# Patient Record
(demographics unavailable — no encounter records)

---

## 2025-03-21 NOTE — HISTORY OF PRESENT ILLNESS
[de-identified] : 28 F With depression, anxiety, PTSD, bipolar type II disorder is presenting today for a physical examination and acute visit.  The patient states that she has a strong family history of cancer and autoimmune disease.  She follows with a hematologist at [facility] in [place].  She recently had blood work done by the hematologist which has been reviewed by me today and included a workup for lymphoma.  The patient reports that after her second pregnancy in 2019 she started developing nonspecific symptoms intermittently including night sweats, temperature changes, rashes, painful lymph nodes in her neck, joint pain specifically of the right knee but also feels soreness in her hands, hair loss, fatigue, loss of appetite as well as increased migraines. In addition, she has not taken her psychiatric medications in several days. She follows with a psychiatrist who prescribes her medications.  She also reports worsening asthma symptoms and cannot lay flat at night due to wheezing.  She has been using her albuterol inhaler more than usual.

## 2025-03-21 NOTE — HEALTH RISK ASSESSMENT
[Good] : ~his/her~  mood as  good [Yes] : Yes [Monthly or less (1 pt)] : Monthly or less (1 point) [1 or 2 (0 pts)] : 1 or 2 (0 points) [Never (0 pts)] : Never (0 points) [No] : In the past 12 months have you used drugs other than those required for medical reasons? No [No falls in past year] : Patient reported no falls in the past year [Little interest or pleasure doing things] : 1) Little interest or pleasure doing things [Feeling down, depressed, or hopeless] : 2) Feeling down, depressed, or hopeless [0] : 2) Feeling down, depressed, or hopeless: Not at all (0) [Patient reported PAP Smear was normal] : Patient reported PAP Smear was normal [HIV Test offered] : HIV Test offered [Hepatitis C test offered] : Hepatitis C test offered [With Family] : lives with family [Employed] : employed [Single] : single [# Of Children ___] : has [unfilled] children [Sexually Active] : sexually active [Feels Safe at Home] : Feels safe at home [Fully functional (bathing, dressing, toileting, transferring, walking, feeding)] : Fully functional (bathing, dressing, toileting, transferring, walking, feeding) [Fully functional (using the telephone, shopping, preparing meals, housekeeping, doing laundry, using] : Fully functional and needs no help or supervision to perform IADLs (using the telephone, shopping, preparing meals, housekeeping, doing laundry, using transportation, managing medications and managing finances) [Smoke Detector] : smoke detector [Carbon Monoxide Detector] : carbon monoxide detector [Seat Belt] :  uses seat belt [Sunscreen] : uses sunscreen [Never] : Never [NO] : No [PHQ-2 Negative - No further assessment needed] : PHQ-2 Negative - No further assessment needed [de-identified] : None [de-identified] : None [de-identified] : Ocassionally [de-identified] : Workout 5 times a week [de-identified] : trying to gain weight [[id number]] : 0 [Change in mental status noted] : No change in mental status noted [Language] : denies difficulty with language [Behavior] : denies difficulty with behavior [Learning/Retaining New Information] : denies difficulty learning/retaining new information [Handling Complex Tasks] : denies difficulty handling complex tasks [Reasoning] : denies difficulty with reasoning [Spatial Ability and Orientation] : denies difficulty with spatial ability and orientation [Reports changes in hearing] : Reports no changes in hearing [Reports changes in vision] : Reports no changes in vision [Reports normal functional visual acuity (ie: able to read med bottle)] : Reports poor functional visual acuity.  [Reports changes in dental health] : Reports no changes in dental health [Safety elements used in home] : no safety elements used in home [Travel to Developing Areas] : does not  travel to developing areas [TB Exposure] : is not being exposed to tuberculosis [Caregiver Concerns] : does not have caregiver concerns [PapSmearDate] : 01/2022

## 2025-03-21 NOTE — HEALTH RISK ASSESSMENT
[Good] : ~his/her~  mood as  good [Yes] : Yes [Monthly or less (1 pt)] : Monthly or less (1 point) [1 or 2 (0 pts)] : 1 or 2 (0 points) [Never (0 pts)] : Never (0 points) [No] : In the past 12 months have you used drugs other than those required for medical reasons? No [No falls in past year] : Patient reported no falls in the past year [Little interest or pleasure doing things] : 1) Little interest or pleasure doing things [Feeling down, depressed, or hopeless] : 2) Feeling down, depressed, or hopeless [0] : 2) Feeling down, depressed, or hopeless: Not at all (0) [Patient reported PAP Smear was normal] : Patient reported PAP Smear was normal [HIV Test offered] : HIV Test offered [Hepatitis C test offered] : Hepatitis C test offered [With Family] : lives with family [Employed] : employed [Single] : single [# Of Children ___] : has [unfilled] children [Sexually Active] : sexually active [Feels Safe at Home] : Feels safe at home [Fully functional (bathing, dressing, toileting, transferring, walking, feeding)] : Fully functional (bathing, dressing, toileting, transferring, walking, feeding) [Fully functional (using the telephone, shopping, preparing meals, housekeeping, doing laundry, using] : Fully functional and needs no help or supervision to perform IADLs (using the telephone, shopping, preparing meals, housekeeping, doing laundry, using transportation, managing medications and managing finances) [Smoke Detector] : smoke detector [Carbon Monoxide Detector] : carbon monoxide detector [Seat Belt] :  uses seat belt [Sunscreen] : uses sunscreen [Never] : Never [NO] : No [PHQ-2 Negative - No further assessment needed] : PHQ-2 Negative - No further assessment needed [de-identified] : None [de-identified] : None [de-identified] : Ocassionally [de-identified] : Workout 5 times a week [de-identified] : trying to gain weight [[id number]] : 0 [Change in mental status noted] : No change in mental status noted [Language] : denies difficulty with language [Behavior] : denies difficulty with behavior [Learning/Retaining New Information] : denies difficulty learning/retaining new information [Handling Complex Tasks] : denies difficulty handling complex tasks [Reasoning] : denies difficulty with reasoning [Spatial Ability and Orientation] : denies difficulty with spatial ability and orientation [Reports changes in hearing] : Reports no changes in hearing [Reports changes in vision] : Reports no changes in vision [Reports normal functional visual acuity (ie: able to read med bottle)] : Reports poor functional visual acuity.  [Reports changes in dental health] : Reports no changes in dental health [Safety elements used in home] : no safety elements used in home [Travel to Developing Areas] : does not  travel to developing areas [TB Exposure] : is not being exposed to tuberculosis [Caregiver Concerns] : does not have caregiver concerns [PapSmearDate] : 01/2022

## 2025-03-21 NOTE — HISTORY OF PRESENT ILLNESS
[de-identified] : 28 F With depression, anxiety, PTSD, bipolar type II disorder is presenting today for a physical examination and acute visit.  The patient states that she has a strong family history of cancer and autoimmune disease.  She follows with a hematologist at [facility] in [place].  She recently had blood work done by the hematologist which has been reviewed by me today and included a workup for lymphoma.  The patient reports that after her second pregnancy in 2019 she started developing nonspecific symptoms intermittently including night sweats, temperature changes, rashes, painful lymph nodes in her neck, joint pain specifically of the right knee but also feels soreness in her hands, hair loss, fatigue, loss of appetite as well as increased migraines. In addition, she has not taken her psychiatric medications in several days. She follows with a psychiatrist who prescribes her medications.  She also reports worsening asthma symptoms and cannot lay flat at night due to wheezing.  She has been using her albuterol inhaler more than usual.

## 2025-03-21 NOTE — REVIEW OF SYSTEMS
[Fatigue] : fatigue [Night Sweats] : night sweats [Shortness Of Breath] : shortness of breath [Wheezing] : wheezing [Joint Pain] : joint pain [Muscle Pain] : muscle pain [Hair Changes] : hair changes [Skin Rash] : skin rash [Headache] : headache [Anxiety] : anxiety [Negative] : Heme/Lymph

## 2025-03-21 NOTE — PHYSICAL EXAM
[Normal] : affect was normal and insight and judgment were intact [de-identified] : small lymph node palpated in the left anterior cervical region

## 2025-03-21 NOTE — PHYSICAL EXAM
[Normal] : affect was normal and insight and judgment were intact [de-identified] : small lymph node palpated in the left anterior cervical region

## 2025-03-21 NOTE — PLAN
[FreeTextEntry1] :  #HCM - CBC with differential, HIV, Hep C, CMP, hemoglobin A1c, vitamin B12, vitamin D, urine analysis, lipid panel, TFTs all ordered. Patient to be informed of results. - The patient has screened negative for depression and excessive alcohol use. - The patient has never used tobacco products. - The [place] Preventive Service Task Force recommends yearly lung cancer screening with LDCT for people who have a 20 pack-year or more smoking history and smoke now or have quit within the past 15 years and are between 50 and 80 years old. - Blood pressure as taken in the office today is within normal limits (<120/<80). - Colon cancer screening was discussed at today's visit  - EKG performed in the office today is within normal limits. - Breast cancer screening has been discussed at today's visit  - Counseled on maintaining a healthy body weight. It has been estimated that up to one-third of cardiovascular disease deaths may be preventable by healthy diet and physical activity.

## 2025-07-24 NOTE — HEALTH RISK ASSESSMENT
[No falls in past year] : Patient reported no falls in the past year [de-identified] : no  [de-identified] : no

## 2025-07-24 NOTE — HISTORY OF PRESENT ILLNESS
[FreeTextEntry1] : pt is here for labs for rheumatology.  [de-identified] : 29-year-old female with asthma, anxiety, depression is presenting today for a follow-up visit.  She is currently following with rheumatology at [facility] (Dr. Dipti Acosta).  She has seen this provider twice (4/23/2025 and then 5/7/2025).  The patient was initially complaining of subjective fever, night sweats, hair loss and skin rash as well as shoulder, knee, finger and back pain for several months.  She also has Raynaud's. Blood work done in the office here was significant for 1:80 KENYA with speckled appearance however she had a negative rheumatoid factor and CRP.  She had an elevated CPK level at rheumatology office. Noted to have been prescribed gabapentin 300 mg 3 times 3 times daily however she did not take.  She was then prescribed prednisone 20 mg tablets to take once a day and she states this improved her symptoms immensely.  Today, she reports worsening right leg pain which she describes as "as if a saw was cutting me". She stopped prednisone about 1 month ago for unclear reasons.

## 2025-07-24 NOTE — PHYSICAL EXAM
[Normal] : no acute distress, well nourished, well developed and well-appearing [de-identified] : Tenderness to palpation of right leg

## 2025-07-24 NOTE — PHYSICAL EXAM
[Normal] : no acute distress, well nourished, well developed and well-appearing [de-identified] : Tenderness to palpation of right leg

## 2025-07-24 NOTE — HISTORY OF PRESENT ILLNESS
[FreeTextEntry1] : pt is here for labs for rheumatology.  [de-identified] : 29-year-old female with asthma, anxiety, depression is presenting today for a follow-up visit.  She is currently following with rheumatology at [facility] (Dr. Dipti Acosta).  She has seen this provider twice (4/23/2025 and then 5/7/2025).  The patient was initially complaining of subjective fever, night sweats, hair loss and skin rash as well as shoulder, knee, finger and back pain for several months.  She also has Raynaud's. Blood work done in the office here was significant for 1:80 KENYA with speckled appearance however she had a negative rheumatoid factor and CRP.  She had an elevated CPK level at rheumatology office. Noted to have been prescribed gabapentin 300 mg 3 times 3 times daily however she did not take.  She was then prescribed prednisone 20 mg tablets to take once a day and she states this improved her symptoms immensely.  Today, she reports worsening right leg pain which she describes as "as if a saw was cutting me". She stopped prednisone about 1 month ago for unclear reasons.

## 2025-07-24 NOTE — HEALTH RISK ASSESSMENT
[No falls in past year] : Patient reported no falls in the past year [de-identified] : no  [de-identified] : no